# Patient Record
Sex: MALE | Race: WHITE | NOT HISPANIC OR LATINO | URBAN - METROPOLITAN AREA
[De-identification: names, ages, dates, MRNs, and addresses within clinical notes are randomized per-mention and may not be internally consistent; named-entity substitution may affect disease eponyms.]

---

## 2021-07-01 ENCOUNTER — TELEPHONE ENCOUNTER (OUTPATIENT)
Dept: URBAN - METROPOLITAN AREA CLINIC 113 | Facility: CLINIC | Age: 61
End: 2021-07-01

## 2021-08-31 ENCOUNTER — WEB ENCOUNTER (OUTPATIENT)
Dept: URBAN - METROPOLITAN AREA CLINIC 113 | Facility: CLINIC | Age: 61
End: 2021-08-31

## 2021-08-31 ENCOUNTER — LAB OUTSIDE AN ENCOUNTER (OUTPATIENT)
Dept: URBAN - METROPOLITAN AREA CLINIC 113 | Facility: CLINIC | Age: 61
End: 2021-08-31

## 2021-08-31 ENCOUNTER — OFFICE VISIT (OUTPATIENT)
Dept: URBAN - METROPOLITAN AREA CLINIC 113 | Facility: CLINIC | Age: 61
End: 2021-08-31
Payer: COMMERCIAL

## 2021-08-31 VITALS
BODY MASS INDEX: 39.27 KG/M2 | WEIGHT: 208 LBS | HEART RATE: 90 BPM | SYSTOLIC BLOOD PRESSURE: 123 MMHG | DIASTOLIC BLOOD PRESSURE: 80 MMHG | TEMPERATURE: 97.7 F | HEIGHT: 61 IN

## 2021-08-31 DIAGNOSIS — D50.9 IRON DEFICIENCY ANEMIA, UNSPECIFIED IRON DEFICIENCY ANEMIA TYPE: ICD-10-CM

## 2021-08-31 DIAGNOSIS — K51.90 ULCERATIVE COLITIS WITHOUT COMPLICATIONS, UNSPECIFIED LOCATION: ICD-10-CM

## 2021-08-31 PROCEDURE — 99204 OFFICE O/P NEW MOD 45 MIN: CPT | Performed by: INTERNAL MEDICINE

## 2021-08-31 RX ORDER — SODIUM, POTASSIUM,MAG SULFATES 17.5-3.13G
354 ML SOLUTION, RECONSTITUTED, ORAL ORAL ONCE
Qty: 354 MILLILITER | Refills: 0 | OUTPATIENT
Start: 2021-08-31 | End: 2021-09-01

## 2021-08-31 NOTE — HPI-TODAY'S VISIT:
PatientToday to establish care.  He is changing GI providers.  He was diagnosed with ulcerative colitis at the age of 22 when he presented with bloody diarrhea.  Unclear the extent of his colitis although he believes it involved "part of the colon".  He reports that he has a flare once or twice a year.  He was managed with sulfasalazine for many years.  More recently he has been on Humira and subsequently Simponi without benefit.  He finished a taper of prednisone a month ago.  Currently he is having 10 bowel movements per day.  Urgency seems to be controlled.  Some bleeding.  His last colonoscopy was 2 years ago.

## 2021-09-03 LAB
A/G RATIO: 1.1
ALBUMIN: 4
ALKALINE PHOSPHATASE: 137
ALT (SGPT): 18
AST (SGOT): 20
BASO (ABSOLUTE): 0.1
BASOS: 1
BILIRUBIN, TOTAL: 0.4
BUN/CREATININE RATIO: 12
BUN: 11
CALCIUM: 9.1
CARBON DIOXIDE, TOTAL: 25
CHLORIDE: 101
CREATININE: 0.9
EGFR IF AFRICN AM: 108
EGFR IF NONAFRICN AM: 93
EOS (ABSOLUTE): 0.4
EOS: 3
FERRITIN, SERUM: 105
GLOBULIN, TOTAL: 3.6
GLUCOSE: 60
HBSAG SCREEN: NEGATIVE
HEMATOCRIT: 45.4
HEMATOLOGY COMMENTS:: (no result)
HEMOGLOBIN: 15.3
IMMATURE CELLS: (no result)
IMMATURE GRANS (ABS): 0.1
IMMATURE GRANULOCYTES: 1
LYMPHS (ABSOLUTE): 2.4
LYMPHS: 19
MCH: 32.4
MCHC: 33.7
MCV: 96
MONOCYTES(ABSOLUTE): 1.5
MONOCYTES: 13
NEUTROPHILS (ABSOLUTE): 7.7
NEUTROPHILS: 63
NRBC: (no result)
PLATELETS: 513
POTASSIUM: 4.5
PROTEIN, TOTAL: 7.6
QUANTIFERON CRITERIA: (no result)
QUANTIFERON INCUBATION: (no result)
QUANTIFERON MITOGEN VALUE: >10
QUANTIFERON NIL VALUE: 0.02
QUANTIFERON TB1 AG VALUE: 0.03
QUANTIFERON TB2 AG VALUE: 0.02
QUANTIFERON-TB GOLD PLUS: NEGATIVE
RBC: 4.72
RDW: 12.4
SODIUM: 138
WBC: 12.1

## 2021-09-17 ENCOUNTER — TELEPHONE ENCOUNTER (OUTPATIENT)
Dept: URBAN - METROPOLITAN AREA CLINIC 92 | Facility: CLINIC | Age: 61
End: 2021-09-17

## 2021-09-17 RX ORDER — POLYETHYLENE GLYCOL 3350, SODIUM CHLORIDE, SODIUM BICARBONATE, POTASSIUM CHLORIDE 420; 11.2; 5.72; 1.48 G/4L; G/4L; G/4L; G/4L
AS DIRECTED POWDER, FOR SOLUTION ORAL ONCE
Qty: 4000 MILLILITER | Refills: 0 | OUTPATIENT
Start: 2021-09-21 | End: 2021-09-22

## 2021-09-22 ENCOUNTER — TELEPHONE ENCOUNTER (OUTPATIENT)
Dept: URBAN - METROPOLITAN AREA CLINIC 113 | Facility: CLINIC | Age: 61
End: 2021-09-22

## 2021-09-22 RX ORDER — POLYETHYLENE GLYCOL 3350, SODIUM CHLORIDE, SODIUM BICARBONATE, POTASSIUM CHLORIDE 420; 11.2; 5.72; 1.48 G/4L; G/4L; G/4L; G/4L
AS DIRECTED POWDER, FOR SOLUTION ORAL ONCE
Qty: 4000 MILLILITER | Refills: 0 | Status: ACTIVE | COMMUNITY
Start: 2021-09-21 | End: 2021-09-22

## 2021-09-22 RX ORDER — VEDOLIZUMAB 300 MG/5ML
INFUSE 300 MG INJECTION, POWDER, LYOPHILIZED, FOR SOLUTION INTRAVENOUS
Qty: 300 MG | Refills: 9 | OUTPATIENT
Start: 2021-10-27 | End: 2024-04-14

## 2021-09-29 ENCOUNTER — OFFICE VISIT (OUTPATIENT)
Dept: URBAN - METROPOLITAN AREA SURGERY CENTER 25 | Facility: SURGERY CENTER | Age: 61
End: 2021-09-29
Payer: COMMERCIAL

## 2021-09-29 ENCOUNTER — LAB OUTSIDE AN ENCOUNTER (OUTPATIENT)
Dept: URBAN - METROPOLITAN AREA CLINIC 113 | Facility: CLINIC | Age: 61
End: 2021-09-29

## 2021-09-29 ENCOUNTER — TELEPHONE ENCOUNTER (OUTPATIENT)
Dept: URBAN - METROPOLITAN AREA CLINIC 113 | Facility: CLINIC | Age: 61
End: 2021-09-29

## 2021-09-29 DIAGNOSIS — K51.012 ULCERATIVE (CHRONIC) PANCOLITIS WITH INTESTINAL OBSTRUCTION: ICD-10-CM

## 2021-09-29 PROCEDURE — G8907 PT DOC NO EVENTS ON DISCHARG: HCPCS | Performed by: INTERNAL MEDICINE

## 2021-09-29 PROCEDURE — 45380 COLONOSCOPY AND BIOPSY: CPT | Performed by: INTERNAL MEDICINE

## 2021-09-29 RX ORDER — PREDNISONE 10 MG/1
4 DAILY FOR 2 WEEKS THEN DECREASE BY 1 TABLET WEEKLY TABLET ORAL ONCE A DAY
Qty: 100 | Refills: 1 | OUTPATIENT

## 2021-10-07 LAB
CPT CODE(S):: (no result)
CPT DISCLAIMER:: (no result)
DIAGNOSIS SYNOPSIS:: (no result)
DIAGNOSIS:: (no result)
ENDOSCOPIC FINDINGS:: (no result)
Lab: (no result)
MICROSCOPIC DESCRIPTION:: (no result)
PATHOLOGIST PROVIDED ICD:: (no result)
SPECIMEN:: (no result)

## 2021-11-01 ENCOUNTER — OFFICE VISIT (OUTPATIENT)
Dept: URBAN - METROPOLITAN AREA CLINIC 107 | Facility: CLINIC | Age: 61
End: 2021-11-01
Payer: COMMERCIAL

## 2021-11-01 VITALS
SYSTOLIC BLOOD PRESSURE: 131 MMHG | TEMPERATURE: 98.3 F | BODY MASS INDEX: 38.33 KG/M2 | HEIGHT: 61 IN | HEART RATE: 96 BPM | DIASTOLIC BLOOD PRESSURE: 84 MMHG | WEIGHT: 203 LBS

## 2021-11-01 DIAGNOSIS — K51.00 ULCERATIVE PANCOLITIS WITHOUT COMPLICATION: ICD-10-CM

## 2021-11-01 PROCEDURE — 99213 OFFICE O/P EST LOW 20 MIN: CPT | Performed by: NURSE PRACTITIONER

## 2021-11-01 RX ORDER — VEDOLIZUMAB 300 MG/5ML
INFUSE 300 MG INJECTION, POWDER, LYOPHILIZED, FOR SOLUTION INTRAVENOUS
Qty: 300 MG | Refills: 9 | Status: ACTIVE | COMMUNITY
Start: 2021-10-27 | End: 2024-04-14

## 2021-11-01 RX ORDER — PREDNISONE 10 MG/1
4 DAILY FOR 2 WEEKS THEN DECREASE BY 1 TABLET WEEKLY TABLET ORAL ONCE A DAY
Qty: 100 | Refills: 1 | Status: ACTIVE | COMMUNITY

## 2021-11-01 NOTE — HPI-TODAY'S VISIT:
61-year-old male with a history of ulcerative colitis and iron deficiency anemia, presenting for follow-up after a colonoscopy.  He was initially seen in the office on 8/31/2021 to establish care, and admitted that he was changing GI providers.  He was initially diagnosed with ulcerative colitis at age 22 when he presented with bloody diarrhea.  He reported a flare once or twice per year, and was managed with sulfasalazine for many years.  More recently he was on Humira and subsequently Simponi without benefit.  He completed a taper of prednisone in July 2021.  At the time of his visit he was having up to 10 bowel movements per day without urgency, but there was some bleeding.  He was planned for colonoscopy to assess disease activity.  He would likely need a change in his biologic therapy, possibly Entyvio.  He was planned for labs to include TB testing, hepatitis B serologies and metabolic panel.  As well as ferritin.  Labs 8/31/2021 revealed a negative hepatitis B surface antigen, negative QuantiFERON-TB gold.,  BUN 11, creatinine 0.9, sodium 138, potassium 4.5, T bili 0.4, , AST 20, ALT 18, WBC 12.1, hemoglobin 15.3, platelets 513, ferritin 105.  A colonoscopy was performed on 9/29/2021.  Findings included adequate bowel preparation, anal stricture found on digital rectal examination, pancolitis ulcerative colitis with inflammation noted from the anus to the cecum, moderate in severity.  Multiple biopsies obtained from the entire colon.  Biopsies were consistent with moderately active idiopathic inflammatory bowel disease negative for dysplasia. He was started on steroid taper, and planned for Entyvio.  He tells me that he is to complete the prednisone taper on Wednesday.  He is waiting to hear from the infusion center at [facility] about starting the Entyvio infusions. There is no abdominal pain, nausea or vomiting. He has bowel movements about 8 times per day. There has not been any nocturnal stools since starting prednisone. There is urgency and tenesmus. No blood per rectum. There is no fever or chills, joint pains or cough. Weight is stable. Appetite is fair.

## 2022-01-26 ENCOUNTER — TELEPHONE ENCOUNTER (OUTPATIENT)
Dept: URBAN - METROPOLITAN AREA CLINIC 113 | Facility: CLINIC | Age: 62
End: 2022-01-26

## 2022-01-26 RX ORDER — VEDOLIZUMAB 300 MG/5ML
AS DIRECTED INJECTION, POWDER, LYOPHILIZED, FOR SOLUTION INTRAVENOUS
Qty: 300 MILLIGRAM | Refills: 6 | OUTPATIENT
Start: 2022-01-26 | End: 2023-03-22

## 2022-01-26 RX ORDER — VEDOLIZUMAB 300 MG/5ML
AS DIRECTED INJECTION, POWDER, LYOPHILIZED, FOR SOLUTION INTRAVENOUS
Qty: 300 MILLIGRAM | Refills: 6 | OUTPATIENT
Start: 2022-01-26 | End: 2023-10-18

## 2022-01-28 ENCOUNTER — TELEPHONE ENCOUNTER (OUTPATIENT)
Dept: URBAN - METROPOLITAN AREA CLINIC 113 | Facility: CLINIC | Age: 62
End: 2022-01-28

## 2022-01-28 RX ORDER — PREDNISONE 10 MG/1
4 DAILY FOR 2 WEEKS THEN DECREASE BY 1 TABLET WEEKLY TABLET ORAL ONCE A DAY
Qty: 100 | Refills: 1
End: 2022-03-29

## 2022-02-09 ENCOUNTER — TELEPHONE ENCOUNTER (OUTPATIENT)
Dept: URBAN - METROPOLITAN AREA CLINIC 113 | Facility: CLINIC | Age: 62
End: 2022-02-09

## 2022-02-16 ENCOUNTER — TELEPHONE ENCOUNTER (OUTPATIENT)
Dept: URBAN - METROPOLITAN AREA CLINIC 113 | Facility: CLINIC | Age: 62
End: 2022-02-16

## 2022-02-16 RX ORDER — VEDOLIZUMAB 300 MG/5ML
AS DIRECTED INJECTION, POWDER, LYOPHILIZED, FOR SOLUTION INTRAVENOUS
Qty: 300 MILLIGRAMS | Refills: 6 | OUTPATIENT
Start: 2022-02-16 | End: 2023-11-08

## 2022-02-21 PROBLEM — 64766004: Status: ACTIVE | Noted: 2021-08-31

## 2022-02-24 ENCOUNTER — WEB ENCOUNTER (OUTPATIENT)
Dept: URBAN - METROPOLITAN AREA CLINIC 107 | Facility: CLINIC | Age: 62
End: 2022-02-24

## 2022-02-24 ENCOUNTER — OFFICE VISIT (OUTPATIENT)
Dept: URBAN - METROPOLITAN AREA CLINIC 107 | Facility: CLINIC | Age: 62
End: 2022-02-24
Payer: COMMERCIAL

## 2022-02-24 VITALS
HEART RATE: 88 BPM | SYSTOLIC BLOOD PRESSURE: 122 MMHG | BODY MASS INDEX: 39.46 KG/M2 | TEMPERATURE: 97.4 F | WEIGHT: 209 LBS | HEIGHT: 61 IN | DIASTOLIC BLOOD PRESSURE: 88 MMHG | RESPIRATION RATE: 18 BRPM

## 2022-02-24 DIAGNOSIS — K51.00 ULCERATIVE PANCOLITIS WITHOUT COMPLICATION: ICD-10-CM

## 2022-02-24 PROCEDURE — 99214 OFFICE O/P EST MOD 30 MIN: CPT | Performed by: INTERNAL MEDICINE

## 2022-02-24 RX ORDER — VEDOLIZUMAB 300 MG/5ML
AS DIRECTED INJECTION, POWDER, LYOPHILIZED, FOR SOLUTION INTRAVENOUS
Qty: 300 MILLIGRAMS | Refills: 6 | Status: ACTIVE | COMMUNITY
Start: 2022-02-16 | End: 2023-11-08

## 2022-02-24 RX ORDER — PREDNISONE 10 MG/1
4 DAILY FOR 2 WEEKS THEN DECREASE BY 1 TABLET WEEKLY TABLET ORAL ONCE A DAY
Qty: 100 | Refills: 1 | Status: ACTIVE | COMMUNITY
End: 2022-03-29

## 2022-02-24 RX ORDER — VEDOLIZUMAB 300 MG/5ML
AS DIRECTED INJECTION, POWDER, LYOPHILIZED, FOR SOLUTION INTRAVENOUS
Qty: 300 MILLIGRAM | Refills: 6 | Status: ACTIVE | COMMUNITY
Start: 2022-01-26 | End: 2023-10-18

## 2022-02-24 RX ORDER — VEDOLIZUMAB 300 MG/5ML
INFUSE 300 MG INJECTION, POWDER, LYOPHILIZED, FOR SOLUTION INTRAVENOUS
Qty: 300 MG | Refills: 9 | Status: ACTIVE | COMMUNITY
Start: 2021-10-27 | End: 2024-04-14

## 2022-02-24 RX ORDER — VEDOLIZUMAB 300 MG/5ML
AS DIRECTED INJECTION, POWDER, LYOPHILIZED, FOR SOLUTION INTRAVENOUS
Qty: 300 MILLIGRAM | Refills: 6 | Status: ACTIVE | COMMUNITY
Start: 2022-01-26 | End: 2023-03-22

## 2022-02-24 NOTE — HPI-TODAY'S VISIT:
Patient returns today in follow-up.  Some improvement but he remains on prednisone 20 mg as his Entyvio infusion got canceled at [facility] the day before he was supposed to start.  He reports 5 or 6 stools per day.  Not much in the way of blood.  Little abdominal pain.  No fevers or chills.  No nausea or vomiting.  He is anxious to start the Entyvio.  This has been sent to  infusion

## 2022-03-15 ENCOUNTER — OFFICE VISIT (OUTPATIENT)
Dept: URBAN - METROPOLITAN AREA CLINIC 112 | Facility: CLINIC | Age: 62
End: 2022-03-15
Payer: COMMERCIAL

## 2022-03-15 VITALS
DIASTOLIC BLOOD PRESSURE: 84 MMHG | HEART RATE: 81 BPM | BODY MASS INDEX: 39.08 KG/M2 | HEIGHT: 61 IN | TEMPERATURE: 97.1 F | WEIGHT: 207 LBS | SYSTOLIC BLOOD PRESSURE: 122 MMHG | RESPIRATION RATE: 16 BRPM

## 2022-03-15 DIAGNOSIS — K51.80 CHRONIC PANCOLONIC ULCERATIVE COLITIS: ICD-10-CM

## 2022-03-15 PROCEDURE — 96413 CHEMO IV INFUSION 1 HR: CPT | Performed by: INTERNAL MEDICINE

## 2022-03-15 RX ORDER — PREDNISONE 10 MG/1
4 DAILY FOR 2 WEEKS THEN DECREASE BY 1 TABLET WEEKLY TABLET ORAL ONCE A DAY
Qty: 100 | Refills: 1 | Status: ACTIVE | COMMUNITY
End: 2022-03-29

## 2022-03-15 RX ORDER — VEDOLIZUMAB 300 MG/5ML
AS DIRECTED INJECTION, POWDER, LYOPHILIZED, FOR SOLUTION INTRAVENOUS
Qty: 300 MILLIGRAMS | Refills: 6 | Status: ACTIVE | COMMUNITY
Start: 2022-02-16 | End: 2023-11-08

## 2022-03-15 RX ORDER — VEDOLIZUMAB 300 MG/5ML
INFUSE 300 MG INJECTION, POWDER, LYOPHILIZED, FOR SOLUTION INTRAVENOUS
Qty: 300 MG | Refills: 9 | Status: ACTIVE | COMMUNITY
Start: 2021-10-27 | End: 2024-04-14

## 2022-03-15 RX ORDER — VEDOLIZUMAB 300 MG/5ML
AS DIRECTED INJECTION, POWDER, LYOPHILIZED, FOR SOLUTION INTRAVENOUS
Qty: 300 MILLIGRAM | Refills: 6 | Status: ACTIVE | COMMUNITY
Start: 2022-01-26 | End: 2023-03-22

## 2022-03-15 RX ORDER — VEDOLIZUMAB 300 MG/5ML
AS DIRECTED INJECTION, POWDER, LYOPHILIZED, FOR SOLUTION INTRAVENOUS
Qty: 300 MILLIGRAM | Refills: 6 | Status: ACTIVE | COMMUNITY
Start: 2022-01-26 | End: 2023-10-18

## 2022-03-29 ENCOUNTER — OFFICE VISIT (OUTPATIENT)
Dept: URBAN - METROPOLITAN AREA CLINIC 112 | Facility: CLINIC | Age: 62
End: 2022-03-29
Payer: COMMERCIAL

## 2022-03-29 ENCOUNTER — TELEPHONE ENCOUNTER (OUTPATIENT)
Dept: URBAN - METROPOLITAN AREA CLINIC 113 | Facility: CLINIC | Age: 62
End: 2022-03-29

## 2022-03-29 VITALS
HEART RATE: 90 BPM | BODY MASS INDEX: 38.71 KG/M2 | SYSTOLIC BLOOD PRESSURE: 125 MMHG | HEIGHT: 61 IN | TEMPERATURE: 97.2 F | WEIGHT: 205 LBS | RESPIRATION RATE: 16 BRPM | DIASTOLIC BLOOD PRESSURE: 84 MMHG

## 2022-03-29 DIAGNOSIS — K51.80 CHRONIC PANCOLONIC ULCERATIVE COLITIS: ICD-10-CM

## 2022-03-29 PROCEDURE — 96413 CHEMO IV INFUSION 1 HR: CPT | Performed by: INTERNAL MEDICINE

## 2022-03-29 RX ORDER — VEDOLIZUMAB 300 MG/5ML
AS DIRECTED INJECTION, POWDER, LYOPHILIZED, FOR SOLUTION INTRAVENOUS
Qty: 300 MILLIGRAM | Refills: 6 | Status: ACTIVE | COMMUNITY
Start: 2022-01-26 | End: 2023-10-18

## 2022-03-29 RX ORDER — VEDOLIZUMAB 300 MG/5ML
AS DIRECTED INJECTION, POWDER, LYOPHILIZED, FOR SOLUTION INTRAVENOUS
Qty: 300 MILLIGRAMS | Refills: 6 | Status: ACTIVE | COMMUNITY
Start: 2022-02-16 | End: 2023-11-08

## 2022-03-29 RX ORDER — VEDOLIZUMAB 300 MG/5ML
INFUSE 300 MG INJECTION, POWDER, LYOPHILIZED, FOR SOLUTION INTRAVENOUS
Qty: 300 MG | Refills: 9 | Status: ACTIVE | COMMUNITY
Start: 2021-10-27 | End: 2024-04-14

## 2022-03-29 RX ORDER — PREDNISONE 10 MG/1
4 DAILY FOR 2 WEEKS THEN DECREASE BY 1 TABLET WEEKLY TABLET ORAL ONCE A DAY
Qty: 100 | Refills: 1 | Status: ACTIVE | COMMUNITY
End: 2022-03-29

## 2022-03-29 RX ORDER — PREDNISONE 10 MG/1
2 TABLETS TABLET ORAL ONCE A DAY
Qty: 60 TABLET | Refills: 2 | OUTPATIENT
Start: 2022-03-31 | End: 2022-06-29

## 2022-03-29 RX ORDER — VEDOLIZUMAB 300 MG/5ML
AS DIRECTED INJECTION, POWDER, LYOPHILIZED, FOR SOLUTION INTRAVENOUS
Qty: 300 MILLIGRAM | Refills: 6 | Status: ACTIVE | COMMUNITY
Start: 2022-01-26 | End: 2023-03-22

## 2022-04-20 LAB
A/G RATIO: 1.3
ALBUMIN: 4
ALKALINE PHOSPHATASE: 98
ALT (SGPT): 11
AST (SGOT): 11
BASO (ABSOLUTE): 0.1
BASOS: 1
BILIRUBIN, TOTAL: 0.5
BUN/CREATININE RATIO: 15
BUN: 14
C-REACTIVE PROTEIN, QUANT: 9
CALCIUM: 9.5
CARBON DIOXIDE, TOTAL: 23
CHLORIDE: 100
CREATININE: 0.95
EGFR: 92
EOS (ABSOLUTE): 0.2
EOS: 1
GLOBULIN, TOTAL: 3.1
GLUCOSE: 82
HEMATOCRIT: 47.6
HEMATOLOGY COMMENTS:: (no result)
HEMOGLOBIN: 15.5
IMMATURE CELLS: (no result)
IMMATURE GRANS (ABS): 0.3
IMMATURE GRANULOCYTES: 1
LYMPHS (ABSOLUTE): 2.7
LYMPHS: 15
MCH: 30.2
MCHC: 32.6
MCV: 93
MONOCYTES(ABSOLUTE): 1.7
MONOCYTES: 10
NEUTROPHILS (ABSOLUTE): 12.8
NEUTROPHILS: 72
NRBC: (no result)
PLATELETS: 594
POTASSIUM: 4.2
PROTEIN, TOTAL: 7.1
RBC: 5.14
RDW: 13.3
SODIUM: 138
WBC: 17.8

## 2022-04-26 ENCOUNTER — OFFICE VISIT (OUTPATIENT)
Dept: URBAN - METROPOLITAN AREA CLINIC 112 | Facility: CLINIC | Age: 62
End: 2022-04-26
Payer: COMMERCIAL

## 2022-04-26 VITALS
HEART RATE: 93 BPM | RESPIRATION RATE: 18 BRPM | HEIGHT: 61 IN | DIASTOLIC BLOOD PRESSURE: 85 MMHG | BODY MASS INDEX: 38.51 KG/M2 | SYSTOLIC BLOOD PRESSURE: 130 MMHG | WEIGHT: 204 LBS | TEMPERATURE: 97 F

## 2022-04-26 DIAGNOSIS — K51.90 ULCERATIVE COLITIS: ICD-10-CM

## 2022-04-26 PROCEDURE — 96413 CHEMO IV INFUSION 1 HR: CPT | Performed by: INTERNAL MEDICINE

## 2022-04-26 RX ORDER — PREDNISONE 10 MG/1
2 TABLETS TABLET ORAL ONCE A DAY
Qty: 60 TABLET | Refills: 2 | Status: ACTIVE | COMMUNITY
Start: 2022-03-31 | End: 2022-06-29

## 2022-04-26 RX ORDER — VEDOLIZUMAB 300 MG/5ML
AS DIRECTED INJECTION, POWDER, LYOPHILIZED, FOR SOLUTION INTRAVENOUS
Qty: 300 MILLIGRAM | Refills: 6 | Status: ACTIVE | COMMUNITY
Start: 2022-01-26 | End: 2023-10-18

## 2022-04-26 RX ORDER — VEDOLIZUMAB 300 MG/5ML
AS DIRECTED INJECTION, POWDER, LYOPHILIZED, FOR SOLUTION INTRAVENOUS
Qty: 300 MILLIGRAM | Refills: 6 | Status: ACTIVE | COMMUNITY
Start: 2022-01-26 | End: 2023-03-22

## 2022-04-26 RX ORDER — VEDOLIZUMAB 300 MG/5ML
INFUSE 300 MG INJECTION, POWDER, LYOPHILIZED, FOR SOLUTION INTRAVENOUS
Qty: 300 MG | Refills: 9 | Status: ACTIVE | COMMUNITY
Start: 2021-10-27 | End: 2024-04-14

## 2022-04-26 RX ORDER — VEDOLIZUMAB 300 MG/5ML
AS DIRECTED INJECTION, POWDER, LYOPHILIZED, FOR SOLUTION INTRAVENOUS
Qty: 300 MILLIGRAMS | Refills: 6 | Status: ACTIVE | COMMUNITY
Start: 2022-02-16 | End: 2023-11-08

## 2022-05-12 ENCOUNTER — TELEPHONE ENCOUNTER (OUTPATIENT)
Dept: URBAN - METROPOLITAN AREA CLINIC 107 | Facility: CLINIC | Age: 62
End: 2022-05-12

## 2022-05-12 RX ORDER — PREDNISONE 10 MG/1
4 TABLETS DAILY FOR 2 WEEKS THEN 3 TABLETS DAILY FOR 1 WEEK THEN 2 TABLET DAILY FOR 1 WEEK THEN 1 TABLET DAILY FOR 1 WEEK THEN 1/2 TABLET DAILY FOR ONE WEEK TABLET ORAL ONCE A DAY
Qty: 100 | Refills: 1 | OUTPATIENT
Start: 2022-05-12 | End: 2022-07-11

## 2022-05-19 ENCOUNTER — TELEPHONE ENCOUNTER (OUTPATIENT)
Dept: URBAN - METROPOLITAN AREA CLINIC 107 | Facility: CLINIC | Age: 62
End: 2022-05-19

## 2022-06-02 ENCOUNTER — OFFICE VISIT (OUTPATIENT)
Dept: URBAN - METROPOLITAN AREA CLINIC 107 | Facility: CLINIC | Age: 62
End: 2022-06-02
Payer: COMMERCIAL

## 2022-06-02 ENCOUNTER — LAB OUTSIDE AN ENCOUNTER (OUTPATIENT)
Dept: URBAN - METROPOLITAN AREA CLINIC 107 | Facility: CLINIC | Age: 62
End: 2022-06-02

## 2022-06-02 VITALS
HEIGHT: 61 IN | SYSTOLIC BLOOD PRESSURE: 127 MMHG | DIASTOLIC BLOOD PRESSURE: 82 MMHG | RESPIRATION RATE: 18 BRPM | TEMPERATURE: 97.8 F | HEART RATE: 94 BPM | WEIGHT: 207 LBS | BODY MASS INDEX: 39.08 KG/M2

## 2022-06-02 DIAGNOSIS — Z79.899 HIGH RISK MEDICATION USE: ICD-10-CM

## 2022-06-02 DIAGNOSIS — K51.00 ULCERATIVE PANCOLITIS: ICD-10-CM

## 2022-06-02 PROCEDURE — 99214 OFFICE O/P EST MOD 30 MIN: CPT | Performed by: NURSE PRACTITIONER

## 2022-06-02 RX ORDER — PREDNISONE 10 MG/1
4 TABLETS DAILY FOR 2 WEEKS THEN 3 TABLETS DAILY FOR 1 WEEK THEN 2 TABLET DAILY FOR 1 WEEK THEN 1 TABLET DAILY FOR 1 WEEK THEN 1/2 TABLET DAILY FOR ONE WEEK TABLET ORAL ONCE A DAY
Qty: 100 | Refills: 1 | Status: ACTIVE | COMMUNITY
Start: 2022-05-12 | End: 2022-07-11

## 2022-06-02 NOTE — HPI-TODAY'S VISIT:
This is a 62-year-old male with ulcerative pancolitis, presenting for follow-up. He was seen in the office on 2/24/2022.  At that time he reported some improvement in his symptoms, and was on prednisone 20 mg daily.  He described 5-6 stools per day.  Bleeding had improved and there was little abdominal pain.  He completed induction for Entyvio on 4/26/2022.  He contacted the office on 5/12/2022.  He was describing frequent bowel movements with as many as 20 loose and runny stools per day with blood per rectum.  He was started on prednisone taper with 40 mg daily for 2 weeks, then 30 mg daily for 1 week then 20 mg daily for 1 week then 1 mg daily for 1 week then half a tablet daily for 1 week.  He was recommended stool to rule out C. difficile.  I do not see where this has been completed.  He has not completed stool studies to rule out C. difficile as he lives over an hour away from the office. He plans to complete these at the [facility] in [place]. He is on prednisone daily, currently on 30 mg daily. He is to decrease to 20 mg daily on Friday, 6/3/22. Bowel frequency has reduced to 5 times daily. Bleeding has resolved. Abdominal pain has resolved. There is no nausea or vomiting. His next infusion is tentatively scheduled for Entyvio on 6/21/22.

## 2022-06-03 ENCOUNTER — OFFICE VISIT (OUTPATIENT)
Dept: URBAN - METROPOLITAN AREA CLINIC 107 | Facility: CLINIC | Age: 62
End: 2022-06-03

## 2022-06-09 LAB — C DIFFICILE TOXINS A+B, EIA: NEGATIVE

## 2022-06-16 ENCOUNTER — TELEPHONE ENCOUNTER (OUTPATIENT)
Dept: URBAN - METROPOLITAN AREA CLINIC 113 | Facility: CLINIC | Age: 62
End: 2022-06-16

## 2022-06-21 ENCOUNTER — OFFICE VISIT (OUTPATIENT)
Dept: URBAN - METROPOLITAN AREA CLINIC 112 | Facility: CLINIC | Age: 62
End: 2022-06-21

## 2022-06-24 ENCOUNTER — TELEPHONE ENCOUNTER (OUTPATIENT)
Dept: URBAN - METROPOLITAN AREA CLINIC 113 | Facility: CLINIC | Age: 62
End: 2022-06-24

## 2022-06-27 PROBLEM — 64766004 ULCERATIVE COLITIS: Status: ACTIVE | Noted: 2022-06-27

## 2022-07-25 ENCOUNTER — OFFICE VISIT (OUTPATIENT)
Dept: URBAN - METROPOLITAN AREA SURGERY CENTER 25 | Facility: SURGERY CENTER | Age: 62
End: 2022-07-25
Payer: COMMERCIAL

## 2022-07-25 ENCOUNTER — TELEPHONE ENCOUNTER (OUTPATIENT)
Dept: URBAN - METROPOLITAN AREA CLINIC 113 | Facility: CLINIC | Age: 62
End: 2022-07-25

## 2022-07-25 ENCOUNTER — CLAIMS CREATED FROM THE CLAIM WINDOW (OUTPATIENT)
Dept: URBAN - METROPOLITAN AREA CLINIC 4 | Facility: CLINIC | Age: 62
End: 2022-07-25
Payer: COMMERCIAL

## 2022-07-25 DIAGNOSIS — K51.919 ULCERATIVE COLITIS, UNSPECIFIED WITH UNSPECIFIED COMPLICATIONS: ICD-10-CM

## 2022-07-25 DIAGNOSIS — K51.00 ULCERATIVE PANCOLITIS WITHOUT COMPLICATION: ICD-10-CM

## 2022-07-25 DIAGNOSIS — K51.90 ULCERATIVE COLITIS, UNSPECIFIED, WITHOUT COMPLICATIONS: ICD-10-CM

## 2022-07-25 DIAGNOSIS — K63.89 OTHER SPECIFIED DISEASES OF INTESTINE: ICD-10-CM

## 2022-07-25 PROCEDURE — 88341 IMHCHEM/IMCYTCHM EA ADD ANTB: CPT | Performed by: PATHOLOGY

## 2022-07-25 PROCEDURE — 88305 TISSUE EXAM BY PATHOLOGIST: CPT | Performed by: PATHOLOGY

## 2022-07-25 PROCEDURE — 45331 SIGMOIDOSCOPY AND BIOPSY: CPT | Performed by: INTERNAL MEDICINE

## 2022-07-25 PROCEDURE — G8907 PT DOC NO EVENTS ON DISCHARG: HCPCS | Performed by: INTERNAL MEDICINE

## 2022-07-25 PROCEDURE — 88342 IMHCHEM/IMCYTCHM 1ST ANTB: CPT | Performed by: PATHOLOGY

## 2022-08-03 PROBLEM — 444548001: Status: ACTIVE | Noted: 2021-11-01

## 2022-08-03 PROBLEM — 64766004 ULCERATIVE COLITIS: Status: ACTIVE | Noted: 2022-08-03

## 2022-08-04 ENCOUNTER — WEB ENCOUNTER (OUTPATIENT)
Dept: URBAN - METROPOLITAN AREA CLINIC 107 | Facility: CLINIC | Age: 62
End: 2022-08-04

## 2022-08-10 ENCOUNTER — OFFICE VISIT (OUTPATIENT)
Dept: URBAN - METROPOLITAN AREA CLINIC 107 | Facility: CLINIC | Age: 62
End: 2022-08-10
Payer: COMMERCIAL

## 2022-08-10 VITALS
HEIGHT: 61 IN | WEIGHT: 209 LBS | HEART RATE: 85 BPM | TEMPERATURE: 98.5 F | SYSTOLIC BLOOD PRESSURE: 131 MMHG | DIASTOLIC BLOOD PRESSURE: 88 MMHG | BODY MASS INDEX: 39.46 KG/M2

## 2022-08-10 DIAGNOSIS — Z79.899 HIGH RISK MEDICATION USE: ICD-10-CM

## 2022-08-10 DIAGNOSIS — K51.00 ULCERATIVE PANCOLITIS: ICD-10-CM

## 2022-08-10 DIAGNOSIS — D50.9 IRON DEFICIENCY ANEMIA, UNSPECIFIED IRON DEFICIENCY ANEMIA TYPE: ICD-10-CM

## 2022-08-10 PROBLEM — 87522002: Status: ACTIVE | Noted: 2021-08-31

## 2022-08-10 PROCEDURE — 99215 OFFICE O/P EST HI 40 MIN: CPT | Performed by: INTERNAL MEDICINE

## 2022-08-10 RX ORDER — PREDNISONE 10 MG/1
2 TABLETS TABLET ORAL ONCE A DAY
Qty: 180 TABLET | Refills: 1

## 2022-08-10 NOTE — HPI-TODAY'S VISIT:
Patient returns today in follow-up.  He has severe ulcerative colitis documented on recent endoscopy.  Biopsies were negative for CMV or dysplasia.  He has been on 20 mg of prednisone which is keeping his symptoms under some control clinically.  He has previously failed Humira and more recently Entyvio.  We have considered her invoke.  We have been struggling to get some baseline labs to review prior to this initiation.  He denies any history of coronary artery disease or hypertension or diabetes.  Con tinues to have frequent loose stool with blood.

## 2022-08-11 ENCOUNTER — TELEPHONE ENCOUNTER (OUTPATIENT)
Dept: URBAN - METROPOLITAN AREA CLINIC 113 | Facility: CLINIC | Age: 62
End: 2022-08-11

## 2022-08-17 ENCOUNTER — OFFICE VISIT (OUTPATIENT)
Dept: URBAN - METROPOLITAN AREA CLINIC 113 | Facility: CLINIC | Age: 62
End: 2022-08-17
Payer: COMMERCIAL

## 2022-08-17 DIAGNOSIS — K51.00 ULCERATIVE PANCOLITIS: ICD-10-CM

## 2022-08-17 DIAGNOSIS — D12.6 DYSPLASIA OF COLON: ICD-10-CM

## 2022-08-17 DIAGNOSIS — Z79.899 HIGH RISK MEDICATION USE: ICD-10-CM

## 2022-08-17 PROCEDURE — 99214 OFFICE O/P EST MOD 30 MIN: CPT | Performed by: INTERNAL MEDICINE

## 2022-08-17 RX ORDER — PREDNISONE 10 MG/1
2 TABLETS TABLET ORAL ONCE A DAY
Qty: 180 TABLET | Refills: 1 | Status: ACTIVE | COMMUNITY

## 2022-08-17 NOTE — HPI-TODAY'S VISIT:
Patient is seenTelemedicine today we discussed his pathology results which showed low-grade dysplasia on random biopsies with significant ongoing colitis.  He has previously failed Humira and Entyvio.  We were considering rinvoq.  I explained the biopsy findings to him.  He has been previously referred to Dr. Hedrick to consider colectomy.  He denies any changes in his bowel function.  He has been maintained on 20 mg of prednisone since coming off the Entyvio.

## 2022-09-27 ENCOUNTER — OFFICE VISIT (OUTPATIENT)
Dept: URBAN - METROPOLITAN AREA CLINIC 107 | Facility: CLINIC | Age: 62
End: 2022-09-27

## 2022-09-27 NOTE — HPI-TODAY'S VISIT:
62-year-old male with a history of colon polyps and ulcerative pancolitis presents for follow-up.  He was last seen on 8/10/2022.  He has severe ulcerative colitis documented by recent endoscopy.  Biopsies were negative for CMV or dysplasia.  He had been on 20 mg of prednisone which was controlling his symptoms clinically.  He had tried and failed Humira and Entyvio.  He was recommended a third line biologic such as revoke versus surgery.  He was not excited by the possibility of surgery.  Rinvoq dose, with potential increased risk for cardiac or thromboembolic events.  He does have the risk factor of his age though lacked any other significant risk factors.  He is willing to start this medication and plan for baseline labs.  We will was also referred to Dr. Hedrick to begin conversation of surgery as a consideration. Patient was later recommended a telehealth visit to further discuss his pathology.  He was seen via telehealth on 8/17/2022.  His pathology revealed low-grade dysplasia on random biopsies with significant ongoing colitis.  A colectomy was strongly encouraged for this reason.  We plan to hold off on written Faisal for now and he was to continue prednisone. We do not have any labs for review. Flexible sigmoidoscopy 7/25/2022:Some scarring and irregularity noted.  No mass appreciated.  Inflammation characterized by adherent blood, congestion, erythema, loss of vascularity, pseudopolyps and shallow ulcerations found in a continuous and circumferential pattern from the anus to the splenic flexure.  No sites were spared.  Inflammation was moderate in severity.  Biopsies were taken throughout.  Pathology revealed diffuse chronic active colitis, consistent with ulcerative colitis.  Polypoid low-grade dysplasia, morphologically favor sporadic adenoma.  Negative for infectious organism, high-grade dysplasia or carcinoma.

## 2022-10-31 ENCOUNTER — DASHBOARD ENCOUNTERS (OUTPATIENT)
Age: 62
End: 2022-10-31

## 2022-10-31 ENCOUNTER — OFFICE VISIT (OUTPATIENT)
Dept: URBAN - METROPOLITAN AREA CLINIC 107 | Facility: CLINIC | Age: 62
End: 2022-10-31
Payer: COMMERCIAL

## 2022-10-31 VITALS
DIASTOLIC BLOOD PRESSURE: 76 MMHG | HEART RATE: 89 BPM | BODY MASS INDEX: 37.76 KG/M2 | WEIGHT: 200 LBS | SYSTOLIC BLOOD PRESSURE: 106 MMHG | TEMPERATURE: 98 F | HEIGHT: 61 IN

## 2022-10-31 DIAGNOSIS — K51.00 ULCERATIVE PANCOLITIS: ICD-10-CM

## 2022-10-31 DIAGNOSIS — I81 PORTAL VEIN THROMBOSIS: ICD-10-CM

## 2022-10-31 DIAGNOSIS — D75.839 THROMBOCYTHEMIA: ICD-10-CM

## 2022-10-31 DIAGNOSIS — Z93.2 S/P ILEOSTOMY: ICD-10-CM

## 2022-10-31 DIAGNOSIS — D12.6 DYSPLASIA OF COLON: ICD-10-CM

## 2022-10-31 DIAGNOSIS — Z79.01 CHRONIC ANTICOAGULATION: ICD-10-CM

## 2022-10-31 PROBLEM — 308870004: Status: ACTIVE | Noted: 2022-10-31

## 2022-10-31 PROBLEM — 6631009 THROMBOCYTHEMIA: Status: ACTIVE | Noted: 2022-10-31

## 2022-10-31 PROBLEM — 161686007: Status: ACTIVE | Noted: 2022-10-31

## 2022-10-31 PROBLEM — 711150003: Status: ACTIVE | Noted: 2022-10-31

## 2022-10-31 PROCEDURE — 99204 OFFICE O/P NEW MOD 45 MIN: CPT | Performed by: NURSE PRACTITIONER

## 2022-10-31 RX ORDER — PREDNISONE 10 MG/1
2 TABLETS TABLET ORAL ONCE A DAY
Qty: 180 TABLET | Refills: 1 | Status: ON HOLD | COMMUNITY

## 2022-10-31 RX ORDER — APIXABAN 5 MG/1
1 TABLET TABLET, FILM COATED ORAL TWICE A DAY
Status: ACTIVE | COMMUNITY

## 2022-11-18 ENCOUNTER — CLAIMS CREATED FROM THE CLAIM WINDOW (OUTPATIENT)
Dept: URBAN - METROPOLITAN AREA MEDICAL CENTER 19 | Facility: MEDICAL CENTER | Age: 62
End: 2022-11-18
Payer: COMMERCIAL

## 2022-11-18 DIAGNOSIS — D72.823 LEUKEMOID REACTION: ICD-10-CM

## 2022-11-18 DIAGNOSIS — I81 PORTAL VEIN THROMBOSIS: ICD-10-CM

## 2022-11-18 DIAGNOSIS — R10.11 RUQ ABDOMINAL PAIN: ICD-10-CM

## 2022-11-18 DIAGNOSIS — R74.8 ABNORMAL LEVELS OF OTHER SERUM ENZYMES: ICD-10-CM

## 2022-11-18 DIAGNOSIS — R74.01 ELEVATION OF LEVELS OF LIVER TRANSAMINASE LEVELS: ICD-10-CM

## 2022-11-18 DIAGNOSIS — D72.828 OTHER ELEVATED WHITE BLOOD CELL (WBC) COUNT: ICD-10-CM

## 2022-11-18 DIAGNOSIS — R93.5 ABNORMAL ABDOMINAL CT SCAN: ICD-10-CM

## 2022-11-18 PROCEDURE — 99254 IP/OBS CNSLTJ NEW/EST MOD 60: CPT | Performed by: INTERNAL MEDICINE

## 2022-11-18 PROCEDURE — 99222 1ST HOSP IP/OBS MODERATE 55: CPT | Performed by: INTERNAL MEDICINE

## 2022-11-19 ENCOUNTER — CLAIMS CREATED FROM THE CLAIM WINDOW (OUTPATIENT)
Dept: URBAN - METROPOLITAN AREA MEDICAL CENTER 19 | Facility: MEDICAL CENTER | Age: 62
End: 2022-11-19
Payer: COMMERCIAL

## 2022-11-19 DIAGNOSIS — R74.01 ELEVATION OF LEVELS OF LIVER TRANSAMINASE LEVELS: ICD-10-CM

## 2022-11-19 DIAGNOSIS — R74.8 ABNORMAL LEVELS OF OTHER SERUM ENZYMES: ICD-10-CM

## 2022-11-19 DIAGNOSIS — I81 PORTAL VEIN THROMBOSIS: ICD-10-CM

## 2022-11-19 DIAGNOSIS — D72.823 LEUKEMOID REACTION: ICD-10-CM

## 2022-11-19 DIAGNOSIS — K81.0 ACUTE CHOLECYSTITIS: ICD-10-CM

## 2022-11-19 PROCEDURE — 99232 SBSQ HOSP IP/OBS MODERATE 35: CPT | Performed by: INTERNAL MEDICINE

## 2022-11-19 PROCEDURE — 99233 SBSQ HOSP IP/OBS HIGH 50: CPT | Performed by: INTERNAL MEDICINE

## 2022-11-23 ENCOUNTER — CLAIMS CREATED FROM THE CLAIM WINDOW (OUTPATIENT)
Dept: URBAN - METROPOLITAN AREA MEDICAL CENTER 19 | Facility: MEDICAL CENTER | Age: 62
End: 2022-11-23
Payer: COMMERCIAL

## 2022-11-23 DIAGNOSIS — I81 PORTAL VEIN THROMBOSIS: ICD-10-CM

## 2022-11-23 DIAGNOSIS — R74.8 ABNORMAL LEVELS OF OTHER SERUM ENZYMES: ICD-10-CM

## 2022-11-23 DIAGNOSIS — D72.823 LEUKEMOID REACTION: ICD-10-CM

## 2022-11-23 DIAGNOSIS — R10.11 ABDOMINAL BURNING SENSATION IN RIGHT UPPER QUADRANT: ICD-10-CM

## 2022-11-23 DIAGNOSIS — K29.60 ADENOPAPILLOMATOSIS GASTRICA: ICD-10-CM

## 2022-11-23 DIAGNOSIS — R74.01 ELEVATION OF LEVELS OF LIVER TRANSAMINASE LEVELS: ICD-10-CM

## 2022-11-23 DIAGNOSIS — R93.3 ABN FINDINGS-GI TRACT: ICD-10-CM

## 2022-11-23 DIAGNOSIS — K31.89 ACQUIRED DEFORMITY OF DUODENUM: ICD-10-CM

## 2022-11-23 PROCEDURE — 43239 EGD BIOPSY SINGLE/MULTIPLE: CPT | Performed by: INTERNAL MEDICINE

## 2022-11-24 ENCOUNTER — CLAIMS CREATED FROM THE CLAIM WINDOW (OUTPATIENT)
Dept: URBAN - METROPOLITAN AREA MEDICAL CENTER 19 | Facility: MEDICAL CENTER | Age: 62
End: 2022-11-24
Payer: COMMERCIAL

## 2022-11-24 DIAGNOSIS — R74.8 ABNORMAL LEVELS OF OTHER SERUM ENZYMES: ICD-10-CM

## 2022-11-24 DIAGNOSIS — R11.2 ACUTE NAUSEA WITH NONBILIOUS VOMITING: ICD-10-CM

## 2022-11-24 DIAGNOSIS — R74.01 ELEVATION OF LEVELS OF LIVER TRANSAMINASE LEVELS: ICD-10-CM

## 2022-11-24 DIAGNOSIS — I81 PORTAL VEIN THROMBOSIS: ICD-10-CM

## 2022-11-24 DIAGNOSIS — R10.84 ABDOMINAL CRAMPING, GENERALIZED: ICD-10-CM

## 2022-11-24 DIAGNOSIS — D72.823 LEUKEMOID REACTION: ICD-10-CM

## 2022-11-24 DIAGNOSIS — D72.10 EOSINOPHILIA: ICD-10-CM

## 2022-11-24 DIAGNOSIS — K31.89 ACQUIRED DEFORMITY OF DUODENUM: ICD-10-CM

## 2022-11-24 PROCEDURE — 99232 SBSQ HOSP IP/OBS MODERATE 35: CPT | Performed by: INTERNAL MEDICINE

## 2022-11-25 ENCOUNTER — CLAIMS CREATED FROM THE CLAIM WINDOW (OUTPATIENT)
Dept: URBAN - METROPOLITAN AREA MEDICAL CENTER 19 | Facility: MEDICAL CENTER | Age: 62
End: 2022-11-25
Payer: COMMERCIAL

## 2022-11-25 DIAGNOSIS — R74.8 ABNORMAL LEVELS OF OTHER SERUM ENZYMES: ICD-10-CM

## 2022-11-25 DIAGNOSIS — R74.01 ELEVATION OF LEVELS OF LIVER TRANSAMINASE LEVELS: ICD-10-CM

## 2022-11-25 DIAGNOSIS — I81 PORTAL VEIN THROMBOSIS: ICD-10-CM

## 2022-11-25 DIAGNOSIS — R10.84 ABDOMINAL CRAMPING, GENERALIZED: ICD-10-CM

## 2022-11-25 DIAGNOSIS — K31.89 ACQUIRED DEFORMITY OF DUODENUM: ICD-10-CM

## 2022-11-25 DIAGNOSIS — R11.2 ACUTE NAUSEA WITH NONBILIOUS VOMITING: ICD-10-CM

## 2022-11-25 DIAGNOSIS — D72.823 LEUKEMOID REACTION: ICD-10-CM

## 2022-11-25 PROCEDURE — 99232 SBSQ HOSP IP/OBS MODERATE 35: CPT | Performed by: INTERNAL MEDICINE

## 2022-12-05 PROBLEM — 386789004 EOSINOPHILIA: Status: ACTIVE | Noted: 2022-12-05

## 2022-12-05 PROBLEM — 17920008: Status: ACTIVE | Noted: 2022-10-31

## 2022-12-05 PROBLEM — 414478003 INCREASED BLOOD LEUKOCYTE NUMBER: Status: ACTIVE | Noted: 2022-12-05

## 2022-12-09 ENCOUNTER — OFFICE VISIT (OUTPATIENT)
Dept: URBAN - METROPOLITAN AREA CLINIC 113 | Facility: CLINIC | Age: 62
End: 2022-12-09

## 2022-12-29 ENCOUNTER — LAB OUTSIDE AN ENCOUNTER (OUTPATIENT)
Dept: URBAN - METROPOLITAN AREA CLINIC 113 | Facility: CLINIC | Age: 62
End: 2022-12-29

## 2022-12-29 ENCOUNTER — TELEPHONE ENCOUNTER (OUTPATIENT)
Dept: URBAN - METROPOLITAN AREA CLINIC 113 | Facility: CLINIC | Age: 62
End: 2022-12-29

## 2022-12-29 RX ORDER — POLYETHYLENE GLYCOL 3350, SODIUM CHLORIDE, SODIUM BICARBONATE, POTASSIUM CHLORIDE 420; 11.2; 5.72; 1.48 G/4L; G/4L; G/4L; G/4L
AS DIRECTED POWDER, FOR SOLUTION ORAL ONCE
Qty: 4000 MILLILITER | Refills: 0 | OUTPATIENT
Start: 2022-12-29 | End: 2022-12-30

## 2023-01-12 ENCOUNTER — LAB OUTSIDE AN ENCOUNTER (OUTPATIENT)
Dept: URBAN - METROPOLITAN AREA CLINIC 113 | Facility: CLINIC | Age: 63
End: 2023-01-12

## 2023-01-13 LAB
A/G RATIO: 1.1
ABSOLUTE BASOPHILS: 91
ABSOLUTE EOSINOPHILS: 353
ABSOLUTE LYMPHOCYTES: 1801
ABSOLUTE MONOCYTES: 963
ABSOLUTE NEUTROPHILS: 2491
ALBUMIN: 3.9
ALKALINE PHOSPHATASE: 121
ALT (SGPT): 88
AST (SGOT): 75
BASOPHILS: 1.6
BILIRUBIN, TOTAL: 0.6
BUN/CREATININE RATIO: (no result)
BUN: 10
C-REACTIVE PROTEIN, QUANT: 11.7
CALCIUM: 9.4
CARBON DIOXIDE, TOTAL: 27
CHLORIDE: 103
CREATININE: 0.92
EGFR: 95
EOSINOPHILS: 6.2
GLOBULIN, TOTAL: 3.4
GLUCOSE: 85
HEMATOCRIT: 46.7
HEMOGLOBIN: 15.4
LYMPHOCYTES: 31.6
MCH: 29.9
MCHC: 33
MCV: 90.7
MONOCYTES: 16.9
MPV: 10.6
NEUTROPHILS: 43.7
PLATELET COUNT: 500
POTASSIUM: 4.2
PROTEIN, TOTAL: 7.3
RDW: 14
RED BLOOD CELL COUNT: 5.15
SODIUM: 137
WHITE BLOOD CELL COUNT: 5.7

## 2023-01-24 ENCOUNTER — TELEPHONE ENCOUNTER (OUTPATIENT)
Dept: URBAN - METROPOLITAN AREA CLINIC 113 | Facility: CLINIC | Age: 63
End: 2023-01-24

## 2023-01-25 PROBLEM — 444548001: Status: ACTIVE | Noted: 2021-09-29

## 2023-02-09 ENCOUNTER — CLAIMS CREATED FROM THE CLAIM WINDOW (OUTPATIENT)
Dept: URBAN - METROPOLITAN AREA SURGERY CENTER 25 | Facility: SURGERY CENTER | Age: 63
End: 2023-02-09

## 2023-02-09 ENCOUNTER — CLAIMS CREATED FROM THE CLAIM WINDOW (OUTPATIENT)
Dept: URBAN - METROPOLITAN AREA SURGERY CENTER 25 | Facility: SURGERY CENTER | Age: 63
End: 2023-02-09
Payer: COMMERCIAL

## 2023-02-09 DIAGNOSIS — Z93.2 ILEOSTOMY: ICD-10-CM

## 2023-02-09 DIAGNOSIS — Z01.818 ENCOUNTER FOR PRE-OPERATIVE EXAMINATION: ICD-10-CM

## 2023-02-09 DIAGNOSIS — Z90.49 H/O HEMICOLECTOMY: ICD-10-CM

## 2023-02-09 DIAGNOSIS — K52.3 INDETERMINATE COLITIS: ICD-10-CM

## 2023-02-09 PROCEDURE — G8907 PT DOC NO EVENTS ON DISCHARG: HCPCS | Performed by: INTERNAL MEDICINE

## 2023-02-09 PROCEDURE — 44380 SMALL BOWEL ENDOSCOPY BR/WA: CPT | Performed by: INTERNAL MEDICINE

## 2023-02-09 RX ORDER — APIXABAN 5 MG/1
1 TABLET TABLET, FILM COATED ORAL TWICE A DAY
Status: ACTIVE | COMMUNITY

## 2023-02-09 RX ORDER — PREDNISONE 10 MG/1
2 TABLETS TABLET ORAL ONCE A DAY
Qty: 180 TABLET | Refills: 1 | Status: ON HOLD | COMMUNITY

## 2023-04-06 ENCOUNTER — OFFICE VISIT (OUTPATIENT)
Dept: URBAN - METROPOLITAN AREA CLINIC 113 | Facility: CLINIC | Age: 63
End: 2023-04-06

## 2023-04-06 RX ORDER — APIXABAN 5 MG/1
1 TABLET TABLET, FILM COATED ORAL TWICE A DAY
Status: ACTIVE | COMMUNITY

## 2023-04-06 RX ORDER — PREDNISONE 10 MG/1
2 TABLETS TABLET ORAL ONCE A DAY
Qty: 180 TABLET | Refills: 1 | Status: ON HOLD | COMMUNITY

## 2023-04-06 NOTE — HPI-TODAY'S VISIT:
4-year-old male with a history of ulcerative pancolitis with prior colonoscopy notable for low-grade dysplasia status post ileostomy,, portal vein thrombosis and thrombocythemia on chronic anticoagulation with Eliquis, presenting for follow-up. He was seen in the office 10/31/2022.  He was recommended small bowel enteroscopy to evaluate for evidence of Crohn's disease prior to consideration of completing second stage of his colectomy/created J-pouch prior to third stage of surgery in March.  Postoperative course was notable for development of large portal vein thrombus extending into the superior mesenteric vein and its branches.  He was noted to have an elevated platelet count in the 600s.  He was recommended hematology evaluation for proceeding with procedures.  An ileoscopy was performed 2/9/2023 which was notable for normal appearing neoterminal ileum.  He underwent robotic assisted restorative proctocolectomy on 3/1/2023 with Dr. Hedrick.

## 2024-01-01 ENCOUNTER — CLAIMS CREATED FROM THE CLAIM WINDOW (OUTPATIENT)
Dept: URBAN - METROPOLITAN AREA MEDICAL CENTER 19 | Facility: MEDICAL CENTER | Age: 64
End: 2024-01-01
Payer: COMMERCIAL

## 2024-01-01 ENCOUNTER — OFFICE VISIT (OUTPATIENT)
Dept: URBAN - METROPOLITAN AREA CLINIC 113 | Facility: CLINIC | Age: 64
End: 2024-01-01

## 2024-01-01 DIAGNOSIS — K75.4 AUTOIMMUNE HEPATITIS: ICD-10-CM

## 2024-01-01 DIAGNOSIS — R11.2 NAUSEA AND VOMITING, UNSPECIFIED VOMITING TYPE: ICD-10-CM

## 2024-01-01 DIAGNOSIS — D68.8 OTHER SPECIFIED COAGULATION DEFECTS: ICD-10-CM

## 2024-01-01 DIAGNOSIS — R74.01 ELEVATION OF LEVELS OF LIVER TRANSAMINASE LEVELS: ICD-10-CM

## 2024-01-01 DIAGNOSIS — K72.00 ACUTE AND SUBACUTE HEPATIC FAILURE WITHOUT COMA: ICD-10-CM

## 2024-01-01 DIAGNOSIS — K92.1 MELENA: ICD-10-CM

## 2024-01-01 DIAGNOSIS — D62 ACUTE POSTHEMORRHAGIC ANEMIA: ICD-10-CM

## 2024-01-01 DIAGNOSIS — K74.60 CIRRHOSIS OF LIVER WITHOUT ASCITES, UNSPECIFIED HEPATIC CIRRHOSIS TYPE: ICD-10-CM

## 2024-01-01 DIAGNOSIS — K74.69 OTHER CIRRHOSIS OF LIVER: ICD-10-CM

## 2024-01-01 DIAGNOSIS — I81 PORTAL VEIN THROMBOSIS: ICD-10-CM

## 2024-01-01 DIAGNOSIS — R74.8 ABNORMAL LEVELS OF OTHER SERUM ENZYMES: ICD-10-CM

## 2024-01-01 DIAGNOSIS — D68.4 ACQUIRED COAGULATION FACTOR DEFICIENCY: ICD-10-CM

## 2024-01-01 PROCEDURE — 99232 SBSQ HOSP IP/OBS MODERATE 35: CPT | Performed by: INTERNAL MEDICINE

## 2024-01-01 PROCEDURE — 99233 SBSQ HOSP IP/OBS HIGH 50: CPT | Performed by: INTERNAL MEDICINE

## 2024-05-21 ENCOUNTER — CLAIMS CREATED FROM THE CLAIM WINDOW (OUTPATIENT)
Dept: URBAN - METROPOLITAN AREA MEDICAL CENTER 19 | Facility: MEDICAL CENTER | Age: 64
End: 2024-05-21
Payer: COMMERCIAL

## 2024-05-21 DIAGNOSIS — D68.8 OTHER SPECIFIED COAGULATION DEFECTS: ICD-10-CM

## 2024-05-21 DIAGNOSIS — R74.01 ELEVATION OF LEVELS OF LIVER TRANSAMINASE LEVELS: ICD-10-CM

## 2024-05-21 DIAGNOSIS — R17 ELEVATED BILIRUBIN: ICD-10-CM

## 2024-05-21 DIAGNOSIS — R93.2 ABNORMAL FINDINGS ON DIAGNOSTIC IMAGING OF LIVER AND BILIARY TRACT: ICD-10-CM

## 2024-05-21 PROCEDURE — 99223 1ST HOSP IP/OBS HIGH 75: CPT | Performed by: INTERNAL MEDICINE

## 2024-05-23 ENCOUNTER — CLAIMS CREATED FROM THE CLAIM WINDOW (OUTPATIENT)
Dept: URBAN - METROPOLITAN AREA MEDICAL CENTER 19 | Facility: MEDICAL CENTER | Age: 64
End: 2024-05-23
Payer: COMMERCIAL

## 2024-05-23 DIAGNOSIS — R74.8 ABNORMAL LEVELS OF OTHER SERUM ENZYMES: ICD-10-CM

## 2024-05-23 DIAGNOSIS — R17 JAUNDICE: ICD-10-CM

## 2024-05-23 DIAGNOSIS — D68.8 OTHER SPECIFIED COAGULATION DEFECTS: ICD-10-CM

## 2024-05-23 DIAGNOSIS — R74.01 ELEVATION OF LEVELS OF LIVER TRANSAMINASE LEVELS: ICD-10-CM

## 2024-05-23 DIAGNOSIS — K75.4 AUTOIMMUNE HEPATITIS: ICD-10-CM

## 2024-05-23 PROCEDURE — 99233 SBSQ HOSP IP/OBS HIGH 50: CPT | Performed by: INTERNAL MEDICINE

## 2024-05-29 ENCOUNTER — TELEPHONE ENCOUNTER (OUTPATIENT)
Dept: URBAN - METROPOLITAN AREA CLINIC 113 | Facility: CLINIC | Age: 64
End: 2024-05-29

## 2024-06-12 ENCOUNTER — TELEPHONE ENCOUNTER (OUTPATIENT)
Dept: URBAN - METROPOLITAN AREA CLINIC 113 | Facility: CLINIC | Age: 64
End: 2024-06-12

## 2025-02-26 NOTE — HPI-TODAY'S VISIT:
62-year-old male with ulcerative pancolitis and colon dysplasia, presenting for 6-week follow-up. He was seen 8/17/2022 by telemedicine in regard to pathology results.  His flexible sigmoidoscopy on 7/25/2022 revealed some scarring and irregularity found on digital rectal examination.  There was moderate inflammation found from the anus to the splenic flexure status post biopsies.  Pathology revealed diffuse chronic active colitis consistent with ulcerative colitis.  There is polypoid low-grade dysplasia, morphologically favoring sporadic adenoma.  He was recommended referral to colorectal surgery to discuss colectomy.  He ultimately underwent robot-assisted total abdominal colectomy with end ileostomy on 9/9/2022 with Dr. Chema Hedrick. He presented to the ED 10/12/2022 for complaints of nausea and vomiting and low-grade fever.  Initial lab work showed elevated white blood cell count 12.4, hemoglobin 14.2, hematocrit 42.7, MCV 89.3 and platelets elevated at 538.  CT of the abdomen and pelvis with contrast was obtained and notable for a new large portal vein thrombus, as well as diffuse small bowel edema with diffuse mesenteric edema and interloop fluid concerning for developing small bowel ischemia.  He was admitted to the hospital to the surgical team.  He was recommended a vascular surgery consult, as needed analgesia and antiemetics, and started on empiric Zosyn for elevated white cell count.  He was seen in consultation by Dr. Quiros with vascular surgery.  The thrombus was noted to extend from the portal vein down to SMV as well as its branches.  He was started on a heparin drip with plans for anticoagulation at time of discharge.  He was expected to require at least 6 months of anticoagulation.  Vascular intervention was not felt necessary.  Abdominal exam remained soft throughout hospitalization. His stoma was functioning normally.  He was discharged with plans to transition to [facility]. CT abdomen pelvis with contrast 10/12/2022:New large portal vein thrombus which extends into the superior mesenteric vein and its branches.  There is also diffuse small bowel edema with diffuse mesenteric edema and interloop fluid, concerning for developing small bowel ischemia.  There was also circumferential distal esophageal wall thickening. Labs 10/14/2022:WBC 9.2, hemoglobin 12.5, hematocrit 37.5, platelets 664.  BUN 3, creatinine 0.89, calcium 8.4, T bili 0.7, AST 31, ALT 25,   He is emptying his ostomy bag 4 times per day, but admits that the bag is not full when he empties. Output is loose to soft form. No blood per ostomy. He denies any abdominal pain. No fever or chills. No nausea or vomiting currently. He denies any heartburn or dysphagia. His appetite is fair. He tells me that Dr. Hedrick is wanting to proceed with the second phase of the surgery in December, but would like for the patient to proceed with enteroscopy to evaluate for Crohn's disease first. He denies any reflux symptoms.
Patient